# Patient Record
Sex: FEMALE | URBAN - METROPOLITAN AREA
[De-identification: names, ages, dates, MRNs, and addresses within clinical notes are randomized per-mention and may not be internally consistent; named-entity substitution may affect disease eponyms.]

---

## 2023-11-21 PROBLEM — E44.1 MILD PROTEIN-CALORIE MALNUTRITION (MULTI): Status: ACTIVE | Noted: 2023-11-21

## 2023-11-21 PROBLEM — M54.41 LUMBAGO WITH SCIATICA, RIGHT SIDE: Status: ACTIVE | Noted: 2023-11-21

## 2023-11-21 PROBLEM — G89.29 CHRONIC PAIN: Status: ACTIVE | Noted: 2023-11-21

## 2023-11-21 PROBLEM — I10 HYPERTENSION: Status: ACTIVE | Noted: 2023-11-21

## 2023-11-21 PROBLEM — E78.5 HYPERLIPIDEMIA: Status: ACTIVE | Noted: 2023-11-21

## 2023-11-22 ENCOUNTER — TELEPHONE (OUTPATIENT)
Dept: PRIMARY CARE | Facility: CLINIC | Age: 83
End: 2023-11-22

## 2023-11-22 NOTE — TELEPHONE ENCOUNTER
Katerina leaves message requesting Verbal Orders for Speech Therapy for patient at [facility], once per week for 10 weeks for cognition. Please Advise.

## 2024-01-02 ENCOUNTER — NURSING HOME VISIT (OUTPATIENT)
Dept: POST ACUTE CARE | Facility: EXTERNAL LOCATION | Age: 84
End: 2024-01-02

## 2024-01-02 DIAGNOSIS — E78.2 MIXED HYPERLIPIDEMIA: ICD-10-CM

## 2024-01-02 DIAGNOSIS — I10 PRIMARY HYPERTENSION: Primary | ICD-10-CM

## 2024-01-02 DIAGNOSIS — M15.9 PRIMARY OSTEOARTHRITIS INVOLVING MULTIPLE JOINTS: ICD-10-CM

## 2024-01-02 DIAGNOSIS — F02.B0 MODERATE DEMENTIA ASSOCIATED WITH OTHER UNDERLYING DISEASE, WITHOUT BEHAVIORAL DISTURBANCE, PSYCHOTIC DISTURBANCE, MOOD DISTURBANCE, OR ANXIETY (MULTI): ICD-10-CM

## 2024-01-02 ASSESSMENT — PAIN SCALES - GENERAL: PAINLEVEL: 0-NO PAIN

## 2024-01-03 VITALS
HEIGHT: 61 IN | SYSTOLIC BLOOD PRESSURE: 134 MMHG | BODY MASS INDEX: 19.6 KG/M2 | WEIGHT: 103.8 LBS | DIASTOLIC BLOOD PRESSURE: 68 MMHG | HEART RATE: 84 BPM | RESPIRATION RATE: 16 BRPM

## 2024-01-03 PROBLEM — M15.0 PRIMARY OSTEOARTHRITIS INVOLVING MULTIPLE JOINTS: Status: ACTIVE | Noted: 2024-01-03

## 2024-01-03 PROBLEM — F03.90 DEMENTIA WITHOUT BEHAVIORAL DISTURBANCE, PSYCHOTIC DISTURBANCE, MOOD DISTURBANCE, OR ANXIETY (MULTI): Status: ACTIVE | Noted: 2024-01-03

## 2024-01-03 PROBLEM — M15.9 PRIMARY OSTEOARTHRITIS INVOLVING MULTIPLE JOINTS: Status: ACTIVE | Noted: 2024-01-03

## 2024-01-03 RX ORDER — ACETAMINOPHEN 500 MG
1000 TABLET ORAL 2 TIMES DAILY
COMMUNITY
Start: 2023-08-14

## 2024-01-03 RX ORDER — PAROXETINE HYDROCHLORIDE 20 MG/1
20 TABLET, FILM COATED ORAL EVERY MORNING
COMMUNITY

## 2024-01-03 RX ORDER — ALUMINUM HYDROXIDE, MAGNESIUM HYDROXIDE, AND SIMETHICONE 1200; 120; 1200 MG/30ML; MG/30ML; MG/30ML
15 SUSPENSION ORAL EVERY 8 HOURS PRN
COMMUNITY

## 2024-01-03 RX ORDER — EMOLLIENT - LOTION
1 LOTION TOPICAL AS NEEDED
COMMUNITY

## 2024-01-03 RX ORDER — SIMVASTATIN 40 MG/1
40 TABLET, FILM COATED ORAL NIGHTLY
COMMUNITY

## 2024-01-03 RX ORDER — OLMESARTAN MEDOXOMIL 20 MG/1
20 TABLET ORAL DAILY
COMMUNITY

## 2024-01-03 RX ORDER — ACETAMINOPHEN 500 MG
500 TABLET ORAL EVERY 4 HOURS PRN
COMMUNITY

## 2024-01-03 RX ORDER — ALBUTEROL SULFATE 90 UG/1
2 AEROSOL, METERED RESPIRATORY (INHALATION) EVERY 6 HOURS PRN
COMMUNITY
Start: 2023-04-24

## 2024-01-03 RX ORDER — CERAMIDE 1,3,6-II/SALICYLIC/B3
1 CLEANSER (ML) TOPICAL AS NEEDED
COMMUNITY

## 2024-01-03 RX ORDER — ESOMEPRAZOLE MAGNESIUM 40 MG/1
1 CAPSULE, DELAYED RELEASE ORAL 2 TIMES DAILY
COMMUNITY

## 2024-01-03 ASSESSMENT — ENCOUNTER SYMPTOMS
FATIGUE: 0
APPETITE CHANGE: 0
FREQUENCY: 0
SHORTNESS OF BREATH: 0
SLEEP DISTURBANCE: 0
MYALGIAS: 1
WEAKNESS: 0
CONSTIPATION: 0
NERVOUS/ANXIOUS: 0
FEVER: 0
DIZZINESS: 0
ACTIVITY CHANGE: 0
HEADACHES: 0
COUGH: 0
RHINORRHEA: 0
ARTHRALGIAS: 1

## 2024-01-03 NOTE — PROGRESS NOTES
"Subjective   Patient ID: Berenice Townsend is a 83 y.o. female who is assisted living/ home patient being seen and evaluated at [facility] for Annual AL Re-certification.     HPI   Pt visited in apartment at [facility], oriented x1, comfortable, and denies pain. Pt denies changes in vision and hearing. Reports appetite is \"not bad\", denies difficulty with chewing and swallowing. Denies headache, dizziness, congestion, runny nose, and nausea and vomiting.          Pt denies chest pain, palpitations and sob at rest or exertion. Pt denies constipation and voiding symptoms. Pt ambulates independently.  Denies any recent falls. Pt with recent complaints of right knee pain due to arthritis. Tylenol was increased to routine twice daily, and has been effective as of currently. Pt denies sleep disturbances. Pt denies feelings of anxiety or sadness. Pt thankful for visit, and had no further questions. Collateral information obtained from nursing d/t pt's cognition.    Current Outpatient Medications on File Prior to Visit   Medication Sig Dispense Refill    acetaminophen (Tylenol) 500 mg tablet Take 2 tablets (1,000 mg) by mouth 2 times a day.      albuterol 90 mcg/actuation inhaler Inhale 2 puffs every 6 hours if needed for wheezing.      PARoxetine (Paxil) 20 mg tablet Take 1 tablet (20 mg) by mouth once daily in the morning.      simvastatin (Zocor) 40 mg tablet Take 1 tablet (40 mg) by mouth once daily at bedtime.      acetaminophen (Tylenol) 500 mg tablet Take 1 tablet (500 mg) by mouth every 4 hours if needed for moderate pain (4 - 6), headaches or mild pain (1 - 3).      alum-mag hydroxide-simeth (Mylanta) 200-200-20 mg/5 mL oral suspension Take 15 mL by mouth every 8 hours if needed for indigestion.      ceramides  lotion (CeraVe Daily Moisturizing) lotion lotion Apply 1 Application topically if needed (dry skin).      Cetaphil (Cetaphil Moisturizing) moisturizing lotion Apply 1 Application " "topically if needed for dry skin.      esomeprazole (NexIUM) 40 mg DR capsule Take 1 capsule (40 mg) by mouth 2 times a day.      menthol 5 % adhesive patch,medicated Place 1 patch on the skin 2 times a day as needed (pain).      olmesartan (BENIcar) 20 mg tablet Take 1 tablet (20 mg) by mouth once daily.       No current facility-administered medications on file prior to visit.        Review of Systems   Constitutional:  Negative for activity change, appetite change, fatigue and fever.   HENT:  Negative for congestion and rhinorrhea.    Respiratory:  Negative for cough and shortness of breath.    Cardiovascular:  Negative for leg swelling.   Gastrointestinal:  Negative for constipation.   Genitourinary:  Negative for frequency.   Musculoskeletal:  Positive for arthralgias and myalgias.   Neurological:  Negative for dizziness, weakness and headaches.   Psychiatric/Behavioral:  Negative for sleep disturbance. The patient is not nervous/anxious.        Objective   /68 (BP Location: Right arm, Patient Position: Sitting, BP Cuff Size: Adult)   Pulse 84   Resp 16 Comment: 96% on RA  Ht 1.549 m (5' 1\")   Wt 47.1 kg (103 lb 12.8 oz)   BMI 19.61 kg/m²     Physical Exam  Constitutional:       General: She is not in acute distress.     Appearance: Normal appearance. She is not ill-appearing.   HENT:      Head: Normocephalic.      Nose: Nose normal. No congestion or rhinorrhea.   Eyes:      Conjunctiva/sclera: Conjunctivae normal.      Pupils: Pupils are equal, round, and reactive to light.   Cardiovascular:      Rate and Rhythm: Normal rate and regular rhythm.      Heart sounds: Normal heart sounds.   Pulmonary:      Effort: Pulmonary effort is normal. No respiratory distress.      Breath sounds: Normal breath sounds. No wheezing or rhonchi.   Abdominal:      General: Bowel sounds are normal.   Musculoskeletal:         General: Normal range of motion.      Cervical back: Normal range of motion.   Skin:     General: " Skin is warm.      Capillary Refill: Capillary refill takes less than 2 seconds.   Neurological:      Mental Status: She is alert. Mental status is at baseline.      Comments: Oriented x1   Psychiatric:         Mood and Affect: Mood normal.         Behavior: Behavior normal.         Assessment/Plan   Diagnoses and all orders for this visit:  Primary hypertension  Comments:  Blood pressure controlled.  Mixed hyperlipidemia  Comments:  Continue simvastatin.  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  Primary osteoarthritis involving multiple joints  Comments:  Tylenol recently increased from once daily to twice daily scheduled.

## 2024-02-12 ENCOUNTER — NURSING HOME VISIT (OUTPATIENT)
Dept: POST ACUTE CARE | Facility: EXTERNAL LOCATION | Age: 84
End: 2024-02-12

## 2024-02-12 VITALS
TEMPERATURE: 98.2 F | SYSTOLIC BLOOD PRESSURE: 123 MMHG | HEART RATE: 80 BPM | RESPIRATION RATE: 16 BRPM | HEIGHT: 61 IN | OXYGEN SATURATION: 96 % | WEIGHT: 102.6 LBS | BODY MASS INDEX: 19.37 KG/M2 | DIASTOLIC BLOOD PRESSURE: 73 MMHG

## 2024-02-12 DIAGNOSIS — F51.01 PRIMARY INSOMNIA: Primary | ICD-10-CM

## 2024-02-12 RX ORDER — ACETAMINOPHEN 500 MG
5 TABLET ORAL NIGHTLY
Qty: 30 TABLET | Refills: 1 | Status: SHIPPED | OUTPATIENT
Start: 2024-02-12

## 2024-02-12 ASSESSMENT — ENCOUNTER SYMPTOMS
COUGH: 0
APPETITE CHANGE: 0
MYALGIAS: 1
NERVOUS/ANXIOUS: 0
SHORTNESS OF BREATH: 0
ACTIVITY CHANGE: 0
ARTHRALGIAS: 1
RHINORRHEA: 0
FATIGUE: 1
DIZZINESS: 0
SLEEP DISTURBANCE: 0
CONSTIPATION: 0
FREQUENCY: 0
FEVER: 0
WEAKNESS: 0
HEADACHES: 0
INSOMNIA: 1

## 2024-02-12 ASSESSMENT — PAIN SCALES - GENERAL: PAINLEVEL: 0-NO PAIN

## 2024-02-12 NOTE — LETTER
"Patient: Berenice Townsend  : 1940    Encounter Date: 2024    Subjective  Patient ID: Berenice Townsend is a 83 y.o. female who is assisted living/ home patient being seen at [facility] and evaluated for insomnia.     Insomnia  Associated symptoms include arthralgias, fatigue and myalgias. Pertinent negatives include no congestion, coughing, fever, headaches or weakness.      HPI  Pt visited in apartment with complaints of insomnia. Pt sleeping in chair, legs elevated on rollator. Pt easily awakens when name is called. Pt states \"I'm just taking a nap, I was going to get up now\". Pt admits to staying up late at night due to not being able to sleep. Pt states \"I need to get to bed early, but I'm busy doing stuff\". Pt admits to not feeling rested in the morning from being up late, and not able to fall asleep at a reasonable time. Pt denies fever, chills, headache, and dizziness. Appetite is fair. Breakfast tray in room, pt ate 50%.     Review of Systems   Constitutional:  Positive for fatigue. Negative for activity change, appetite change and fever.   HENT:  Negative for congestion and rhinorrhea.    Respiratory:  Negative for cough and shortness of breath.    Cardiovascular:  Negative for leg swelling.   Gastrointestinal:  Negative for constipation.   Genitourinary:  Negative for frequency.   Musculoskeletal:  Positive for arthralgias and myalgias.   Neurological:  Negative for dizziness, weakness and headaches.   Psychiatric/Behavioral:  Negative for sleep disturbance. The patient has insomnia. The patient is not nervous/anxious.        Objective  /73   Pulse 80   Temp 36.8 °C (98.2 °F)   Resp 16   Ht 1.549 m (5' 1\")   Wt 46.5 kg (102 lb 9.6 oz)   SpO2 96%   BMI 19.39 kg/m²     Physical Exam  Constitutional:       General: She is not in acute distress.     Appearance: Normal appearance. She is not ill-appearing.   HENT:      Head: Normocephalic.      Nose: Nose normal. No congestion or " rhinorrhea.   Eyes:      Conjunctiva/sclera: Conjunctivae normal.      Pupils: Pupils are equal, round, and reactive to light.   Cardiovascular:      Rate and Rhythm: Normal rate and regular rhythm.      Heart sounds: Normal heart sounds.   Pulmonary:      Effort: Pulmonary effort is normal. No respiratory distress.      Breath sounds: Normal breath sounds. No wheezing or rhonchi.   Abdominal:      General: Bowel sounds are normal.   Musculoskeletal:         General: Normal range of motion.      Cervical back: Normal range of motion.   Skin:     General: Skin is warm.      Capillary Refill: Capillary refill takes less than 2 seconds.   Neurological:      Mental Status: She is alert. Mental status is at baseline.      Comments: Oriented x1   Psychiatric:         Mood and Affect: Mood normal.         Behavior: Behavior normal.         Cognition and Memory: Cognition is impaired. Memory is impaired.         Assessment/Plan  Diagnoses and all orders for this visit:  Primary insomnia  -     melatonin 5 mg tablet; Take 1 tablet (5 mg) by mouth once daily at bedtime.            Electronically Signed By: KARELY Powell   2/12/24  9:50 PM

## 2024-02-13 NOTE — PROGRESS NOTES
"Subjective   Patient ID: Berenice Townsend is a 83 y.o. female who is assisted living/ home patient being seen at [facility] and evaluated for insomnia.     Insomnia  Associated symptoms include arthralgias, fatigue and myalgias. Pertinent negatives include no congestion, coughing, fever, headaches or weakness.      HPI  Pt visited in apartment with complaints of insomnia. Pt sleeping in chair, legs elevated on rollator. Pt easily awakens when name is called. Pt states \"I'm just taking a nap, I was going to get up now\". Pt admits to staying up late at night due to not being able to sleep. Pt states \"I need to get to bed early, but I'm busy doing stuff\". Pt admits to not feeling rested in the morning from being up late, and not able to fall asleep at a reasonable time. Pt denies fever, chills, headache, and dizziness. Appetite is fair. Breakfast tray in room, pt ate 50%.     Review of Systems   Constitutional:  Positive for fatigue. Negative for activity change, appetite change and fever.   HENT:  Negative for congestion and rhinorrhea.    Respiratory:  Negative for cough and shortness of breath.    Cardiovascular:  Negative for leg swelling.   Gastrointestinal:  Negative for constipation.   Genitourinary:  Negative for frequency.   Musculoskeletal:  Positive for arthralgias and myalgias.   Neurological:  Negative for dizziness, weakness and headaches.   Psychiatric/Behavioral:  Negative for sleep disturbance. The patient has insomnia. The patient is not nervous/anxious.        Objective   /73   Pulse 80   Temp 36.8 °C (98.2 °F)   Resp 16   Ht 1.549 m (5' 1\")   Wt 46.5 kg (102 lb 9.6 oz)   SpO2 96%   BMI 19.39 kg/m²     Physical Exam  Constitutional:       General: She is not in acute distress.     Appearance: Normal appearance. She is not ill-appearing.   HENT:      Head: Normocephalic.      Nose: Nose normal. No congestion or rhinorrhea.   Eyes:      Conjunctiva/sclera: Conjunctivae normal.      " Pupils: Pupils are equal, round, and reactive to light.   Cardiovascular:      Rate and Rhythm: Normal rate and regular rhythm.      Heart sounds: Normal heart sounds.   Pulmonary:      Effort: Pulmonary effort is normal. No respiratory distress.      Breath sounds: Normal breath sounds. No wheezing or rhonchi.   Abdominal:      General: Bowel sounds are normal.   Musculoskeletal:         General: Normal range of motion.      Cervical back: Normal range of motion.   Skin:     General: Skin is warm.      Capillary Refill: Capillary refill takes less than 2 seconds.   Neurological:      Mental Status: She is alert. Mental status is at baseline.      Comments: Oriented x1   Psychiatric:         Mood and Affect: Mood normal.         Behavior: Behavior normal.         Cognition and Memory: Cognition is impaired. Memory is impaired.         Assessment/Plan   Diagnoses and all orders for this visit:  Primary insomnia  -     melatonin 5 mg tablet; Take 1 tablet (5 mg) by mouth once daily at bedtime.

## 2024-07-08 ENCOUNTER — NURSING HOME VISIT (OUTPATIENT)
Dept: POST ACUTE CARE | Facility: EXTERNAL LOCATION | Age: 84
End: 2024-07-08
Payer: MEDICARE

## 2024-07-08 VITALS
WEIGHT: 97.4 LBS | HEART RATE: 87 BPM | SYSTOLIC BLOOD PRESSURE: 142 MMHG | OXYGEN SATURATION: 97 % | BODY MASS INDEX: 18.39 KG/M2 | TEMPERATURE: 97.9 F | HEIGHT: 61 IN | RESPIRATION RATE: 16 BRPM | DIASTOLIC BLOOD PRESSURE: 80 MMHG

## 2024-07-08 DIAGNOSIS — K59.00 CONSTIPATION, UNSPECIFIED CONSTIPATION TYPE: ICD-10-CM

## 2024-07-08 DIAGNOSIS — R41.0 CONFUSION: ICD-10-CM

## 2024-07-08 DIAGNOSIS — Z74.1 REQUIRES ASSISTANCE WITH ACTIVITIES OF DAILY LIVING (ADL): ICD-10-CM

## 2024-07-08 DIAGNOSIS — M15.9 PRIMARY OSTEOARTHRITIS INVOLVING MULTIPLE JOINTS: ICD-10-CM

## 2024-07-08 DIAGNOSIS — F32.A DEPRESSION, UNSPECIFIED DEPRESSION TYPE: ICD-10-CM

## 2024-07-08 DIAGNOSIS — R29.898 WEAKNESS OF BOTH LEGS: ICD-10-CM

## 2024-07-08 DIAGNOSIS — F02.B0 MODERATE DEMENTIA ASSOCIATED WITH OTHER UNDERLYING DISEASE, WITHOUT BEHAVIORAL DISTURBANCE, PSYCHOTIC DISTURBANCE, MOOD DISTURBANCE, OR ANXIETY (MULTI): Primary | ICD-10-CM

## 2024-07-08 DIAGNOSIS — I10 PRIMARY HYPERTENSION: ICD-10-CM

## 2024-07-08 DIAGNOSIS — E78.2 MIXED HYPERLIPIDEMIA: ICD-10-CM

## 2024-07-08 DIAGNOSIS — F51.01 PRIMARY INSOMNIA: ICD-10-CM

## 2024-07-08 DIAGNOSIS — R53.83 FATIGUE, UNSPECIFIED TYPE: ICD-10-CM

## 2024-07-08 PROCEDURE — 99349 HOME/RES VST EST MOD MDM 40: CPT

## 2024-07-08 RX ORDER — POLYETHYLENE GLYCOL 3350 17 G/17G
17 POWDER, FOR SOLUTION ORAL DAILY
COMMUNITY
Start: 2024-07-07

## 2024-07-08 RX ORDER — TRAZODONE HYDROCHLORIDE 50 MG/1
25 TABLET ORAL NIGHTLY
COMMUNITY
Start: 2024-06-01

## 2024-07-08 RX ORDER — IBUPROFEN 400 MG/1
400 TABLET ORAL EVERY 6 HOURS PRN
COMMUNITY
Start: 2024-07-06 | End: 2024-07-10

## 2024-07-08 RX ORDER — MEMANTINE HYDROCHLORIDE 5 MG/1
5 TABLET ORAL DAILY
COMMUNITY
Start: 2024-06-01

## 2024-07-08 RX ORDER — SERTRALINE HYDROCHLORIDE 50 MG/1
25 TABLET, FILM COATED ORAL DAILY
COMMUNITY
Start: 2024-07-10 | End: 2024-07-17

## 2024-07-08 ASSESSMENT — ENCOUNTER SYMPTOMS
CHILLS: 0
ARTHRALGIAS: 1
PALPITATIONS: 0
SHORTNESS OF BREATH: 0
ACTIVITY CHANGE: 0
SLEEP DISTURBANCE: 0
NERVOUS/ANXIOUS: 0
DIARRHEA: 0
CONFUSION: 1
FEVER: 0
FATIGUE: 1
RHINORRHEA: 0
MYALGIAS: 1
DIZZINESS: 0
FREQUENCY: 0
VOMITING: 0
CONSTIPATION: 0
WEAKNESS: 0
COUGH: 0
HEADACHES: 0
APPETITE CHANGE: 1
NAUSEA: 0

## 2024-07-08 ASSESSMENT — PAIN SCALES - GENERAL: PAINLEVEL: 0-NO PAIN

## 2024-07-08 NOTE — LETTER
"Patient: Berenice Townsend  : 1940    Encounter Date: 2024    Subjective  Patient ID: Berenice Townsend is a 84 y.o. female who is assisted living/ home patient being seen at [facility], and evaluated for Routine AL Visit.     HPI   Pt visited in apartment at assisted living, oriented x1, comfortable, and denies pain. Pt denies changes in vision and hearing. Reports appetite is \"poor, I'm just not hungry.\" Pt's breakfast untouched from this morning, pt with a visible weight loss. Pt eats meals in apartment. Pt denies difficulty with chewing and swallowing. Denies headache, dizziness, congestion, runny nose, and nausea and vomiting. Pt states \"I have been feeling tired, and my legs feel week\". Pt admits to increased confusion over the last few days.    Pt denies chest pain, sob at rest or exertion. Pt denies constipation and voiding symptoms. Pt was started on miralax over the weekend for constipation. Pt has since had a bowel movement. Pt ambulates with rollator. Denies any recent falls. Pt with OA pain to bilateral knees, started on ibuprofen over the weekend prn. Pt denies sleep disturbances, and currently sleeping on couch at bedtime. Bed full of clothes, pt denies this is reason for sleeping on couch, and states \"I always use to sleep on the couch\".  Pt denies feelings of anxiety or sadness. Pt follows psych for depression. Pt thankful for visit, and had no further questions. Collateral information obtained from nursing due to cognition secondary to dementia.     Current Outpatient Medications on File Prior to Visit   Medication Sig Dispense Refill   • acetaminophen (Tylenol) 500 mg tablet Take 1 tablet (500 mg) by mouth every 4 hours if needed for moderate pain (4 - 6), headaches or mild pain (1 - 3).     • acetaminophen (Tylenol) 500 mg tablet Take 2 tablets (1,000 mg) by mouth 2 times a day.     • albuterol 90 mcg/actuation inhaler Inhale 2 puffs every 6 hours if needed for wheezing.     • " alum-mag hydroxide-simeth (Mylanta) 200-200-20 mg/5 mL oral suspension Take 15 mL by mouth every 8 hours if needed for indigestion.     • ceramides  lotion (CeraVe Daily Moisturizing) lotion lotion Apply 1 Application topically if needed (dry skin).     • Cetaphil (Cetaphil Moisturizing) moisturizing lotion Apply 1 Application topically if needed for dry skin.     • esomeprazole (NexIUM) 40 mg DR capsule Take 1 capsule (40 mg) by mouth 2 times a day.     • ibuprofen 400 mg tablet Take 1 tablet (400 mg) by mouth every 6 hours if needed for fever (temp greater than 38.0 C), moderate pain (4 - 6), mild pain (1 - 3) or headaches.     • melatonin 5 mg tablet Take 1 tablet (5 mg) by mouth once daily at bedtime. 30 tablet 1   • memantine (Namenda) 5 mg tablet Take 1 tablet (5 mg) by mouth once daily.     • menthol 5 % adhesive patch,medicated Place 1 patch on the skin 2 times a day as needed (pain).     • olmesartan (BENIcar) 20 mg tablet Take 1 tablet (20 mg) by mouth once daily.     • polyethylene glycol (Glycolax, Miralax) 17 gram packet Take 17 g by mouth once daily.     • sertraline (Zoloft) 50 mg tablet Take 1 tablet (50 mg) by mouth once daily.     • simvastatin (Zocor) 40 mg tablet Take 1 tablet (40 mg) by mouth once daily at bedtime.     • traZODone (Desyrel) 50 mg tablet Take 0.5 tablets (25 mg) by mouth once daily at bedtime.     • [DISCONTINUED] PARoxetine (Paxil) 20 mg tablet Take 1 tablet (20 mg) by mouth once daily in the morning.       No current facility-administered medications on file prior to visit.        Review of Systems   Constitutional:  Positive for appetite change and fatigue. Negative for activity change, chills and fever.   HENT:  Negative for congestion and rhinorrhea.    Respiratory:  Negative for cough and shortness of breath.    Cardiovascular:  Negative for chest pain, palpitations and leg swelling.   Gastrointestinal:  Negative for constipation, diarrhea, nausea and vomiting.  "  Genitourinary:  Negative for frequency.   Musculoskeletal:  Positive for arthralgias, gait problem and myalgias.        Ambulates with rollator   Neurological:  Negative for dizziness, weakness and headaches.   Psychiatric/Behavioral:  Positive for confusion. Negative for sleep disturbance. The patient is not nervous/anxious.        Objective  /80 (BP Location: Right arm, Patient Position: Sitting, BP Cuff Size: Adult)   Pulse 87   Temp 36.6 °C (97.9 °F)   Resp 16   Ht 1.549 m (5' 1\")   Wt (!) 44.2 kg (97 lb 6.4 oz)   SpO2 97%   BMI 18.40 kg/m²     Physical Exam  Constitutional:       General: She is awake. She is not in acute distress.     Appearance: Normal appearance. She is underweight. She is not ill-appearing.   HENT:      Head: Normocephalic.      Nose: Nose normal. No congestion or rhinorrhea.      Mouth/Throat:      Mouth: Mucous membranes are moist.   Eyes:      Conjunctiva/sclera: Conjunctivae normal.      Pupils: Pupils are equal, round, and reactive to light.   Cardiovascular:      Rate and Rhythm: Normal rate and regular rhythm.      Pulses: Normal pulses.      Heart sounds: Normal heart sounds.   Pulmonary:      Effort: Pulmonary effort is normal. No respiratory distress.      Breath sounds: Normal breath sounds. No wheezing or rhonchi.   Abdominal:      General: Bowel sounds are normal.      Palpations: Abdomen is soft.   Musculoskeletal:         General: Normal range of motion.      Cervical back: Normal range of motion.      Right lower leg: No edema.      Left lower leg: No edema.   Skin:     General: Skin is warm.      Capillary Refill: Capillary refill takes less than 2 seconds.   Neurological:      Mental Status: She is alert. Mental status is at baseline.      Comments: Oriented x1   Psychiatric:         Mood and Affect: Mood normal.         Behavior: Behavior normal. Behavior is cooperative.         Cognition and Memory: Cognition is impaired. Memory is impaired. 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  Primary insomnia  Comments:  Continue melatonin.  Primary hypertension  Comments:  Blood pressure controlled.  Mixed hyperlipidemia  Comments:  Continue simvastatin.  Primary osteoarthritis involving multiple joints  Comments:  Ibuprofen started over weekend for pain.  Constipation, unspecified constipation type  Comments:  Miralax started over weekend for constipation.  Confusion  Comments:  Obtain UA  Requires assistance with activities of daily living (ADL)  Comments:  Facility chart reviewed, medication reconciled, BMP, CBC, Lipid Panel, and Vitamin D ordered.  Depression, unspecified depression type  Comments:  Continue to follow psych for medication mgmt.  Fatigue, unspecified type  Comments:  BMP, CBC, Vitamin D level ordered.            Electronically Signed By: KARELY Powell   7/8/24  3:47 PM

## 2024-07-08 NOTE — PROGRESS NOTES
"Subjective   Patient ID: Berenice Townsend is a 84 y.o. female who is assisted living/ home patient being seen at [facility], and evaluated for Routine AL Visit.     HPI   Pt visited in apartment at [facility], oriented x1, comfortable, and denies pain. Pt denies changes in vision and hearing. Reports appetite is \"poor, I'm just not hungry.\" Pt's breakfast untouched from this morning, pt with a visible weight loss. Pt eats meals in apartment. Pt denies difficulty with chewing and swallowing. Denies headache, dizziness, congestion, runny nose, and nausea and vomiting. Pt states \"I have been feeling tired, and my legs feel week\". Pt admits to increased confusion over the last few days.    Pt denies chest pain, sob at rest or exertion. Pt denies constipation and voiding symptoms. Pt was started on miralax over the weekend for constipation. Pt has since had a bowel movement. Pt ambulates with rollator. Denies any recent falls. Pt with OA pain to bilateral knees, started on ibuprofen over the weekend prn. Pt denies sleep disturbances, and currently sleeping on couch at bedtime. Bed full of clothes, pt denies this is reason for sleeping on couch, and states \"I always use to sleep on the couch\".  Pt denies feelings of anxiety or sadness. Pt follows psych for depression. Pt thankful for visit, and had no further questions. Collateral information obtained from nursing due to cognition secondary to dementia. Pt would benefit with PT/OT to increase strength, endurance, mobility and decrease risk for falls.     Current Outpatient Medications on File Prior to Visit   Medication Sig Dispense Refill    acetaminophen (Tylenol) 500 mg tablet Take 1 tablet (500 mg) by mouth every 4 hours if needed for moderate pain (4 - 6), headaches or mild pain (1 - 3).      acetaminophen (Tylenol) 500 mg tablet Take 2 tablets (1,000 mg) by mouth 2 times a day.      albuterol 90 mcg/actuation inhaler Inhale 2 puffs every 6 hours if needed for " wheezing.      alum-mag hydroxide-simeth (Mylanta) 200-200-20 mg/5 mL oral suspension Take 15 mL by mouth every 8 hours if needed for indigestion.      ceramides  lotion (CeraVe Daily Moisturizing) lotion lotion Apply 1 Application topically if needed (dry skin).      Cetaphil (Cetaphil Moisturizing) moisturizing lotion Apply 1 Application topically if needed for dry skin.      esomeprazole (NexIUM) 40 mg DR capsule Take 1 capsule (40 mg) by mouth 2 times a day.      ibuprofen 400 mg tablet Take 1 tablet (400 mg) by mouth every 6 hours if needed for fever (temp greater than 38.0 C), moderate pain (4 - 6), mild pain (1 - 3) or headaches.      melatonin 5 mg tablet Take 1 tablet (5 mg) by mouth once daily at bedtime. 30 tablet 1    memantine (Namenda) 5 mg tablet Take 1 tablet (5 mg) by mouth once daily.      menthol 5 % adhesive patch,medicated Place 1 patch on the skin 2 times a day as needed (pain).      olmesartan (BENIcar) 20 mg tablet Take 1 tablet (20 mg) by mouth once daily.      polyethylene glycol (Glycolax, Miralax) 17 gram packet Take 17 g by mouth once daily.      sertraline (Zoloft) 50 mg tablet Take 1 tablet (50 mg) by mouth once daily.      simvastatin (Zocor) 40 mg tablet Take 1 tablet (40 mg) by mouth once daily at bedtime.      traZODone (Desyrel) 50 mg tablet Take 0.5 tablets (25 mg) by mouth once daily at bedtime.      [DISCONTINUED] PARoxetine (Paxil) 20 mg tablet Take 1 tablet (20 mg) by mouth once daily in the morning.       No current facility-administered medications on file prior to visit.        Review of Systems   Constitutional:  Positive for appetite change and fatigue. Negative for activity change, chills and fever.   HENT:  Negative for congestion and rhinorrhea.    Respiratory:  Negative for cough and shortness of breath.    Cardiovascular:  Negative for chest pain, palpitations and leg swelling.   Gastrointestinal:  Negative for constipation, diarrhea, nausea and vomiting.  "  Genitourinary:  Negative for frequency.   Musculoskeletal:  Positive for arthralgias, gait problem and myalgias.        Ambulates with rollator   Neurological:  Negative for dizziness, weakness and headaches.   Psychiatric/Behavioral:  Positive for confusion. Negative for sleep disturbance. The patient is not nervous/anxious.        Objective   /80 (BP Location: Right arm, Patient Position: Sitting, BP Cuff Size: Adult)   Pulse 87   Temp 36.6 °C (97.9 °F)   Resp 16   Ht 1.549 m (5' 1\")   Wt (!) 44.2 kg (97 lb 6.4 oz)   SpO2 97%   BMI 18.40 kg/m²     Physical Exam  Constitutional:       General: She is awake. She is not in acute distress.     Appearance: Normal appearance. She is underweight. She is not ill-appearing.   HENT:      Head: Normocephalic.      Nose: Nose normal. No congestion or rhinorrhea.      Mouth/Throat:      Mouth: Mucous membranes are moist.   Eyes:      Conjunctiva/sclera: Conjunctivae normal.      Pupils: Pupils are equal, round, and reactive to light.   Cardiovascular:      Rate and Rhythm: Normal rate and regular rhythm.      Pulses: Normal pulses.      Heart sounds: Normal heart sounds.   Pulmonary:      Effort: Pulmonary effort is normal. No respiratory distress.      Breath sounds: Normal breath sounds. No wheezing or rhonchi.   Abdominal:      General: Bowel sounds are normal.      Palpations: Abdomen is soft.   Musculoskeletal:         General: Normal range of motion.      Cervical back: Normal range of motion.      Right lower leg: No edema.      Left lower leg: No edema.   Skin:     General: Skin is warm.      Capillary Refill: Capillary refill takes less than 2 seconds.   Neurological:      Mental Status: She is alert. Mental status is at baseline.      Comments: Oriented x1   Psychiatric:         Mood and Affect: Mood normal.         Behavior: Behavior normal. Behavior is cooperative.         Cognition and Memory: Cognition is impaired. Memory is impaired. 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  Primary insomnia  Comments:  Continue melatonin.  Primary hypertension  Comments:  Blood pressure controlled.  Mixed hyperlipidemia  Comments:  Continue simvastatin.  Primary osteoarthritis involving multiple joints  Comments:  Ibuprofen started over weekend for pain.  Constipation, unspecified constipation type  Comments:  Miralax started over weekend for constipation.  Confusion  Comments:  Obtain UA  Requires assistance with activities of daily living (ADL)  Comments:  Facility chart reviewed, medication reconciled, BMP, CBC, Lipid Panel, and Vitamin D ordered.  Depression, unspecified depression type  Comments:  Continue to follow psych for medication mgmt.  Fatigue, unspecified type  Comments:  BMP, CBC, Vitamin D level ordered.

## 2024-07-10 ENCOUNTER — HOSPITAL ENCOUNTER (EMERGENCY)
Facility: HOSPITAL | Age: 84
Discharge: HOME | End: 2024-07-11
Attending: STUDENT IN AN ORGANIZED HEALTH CARE EDUCATION/TRAINING PROGRAM | Admitting: EMERGENCY MEDICINE
Payer: MEDICARE

## 2024-07-10 VITALS
BODY MASS INDEX: 17.27 KG/M2 | DIASTOLIC BLOOD PRESSURE: 93 MMHG | OXYGEN SATURATION: 96 % | RESPIRATION RATE: 15 BRPM | WEIGHT: 97.44 LBS | SYSTOLIC BLOOD PRESSURE: 172 MMHG | HEIGHT: 63 IN | HEART RATE: 80 BPM | TEMPERATURE: 97.7 F

## 2024-07-10 DIAGNOSIS — E87.1 HYPONATREMIA: Primary | ICD-10-CM

## 2024-07-10 LAB
ANION GAP SERPL CALC-SCNC: 9 MMOL/L
APPEARANCE UR: CLEAR
BACTERIA #/AREA URNS AUTO: ABNORMAL /HPF
BASOPHILS # BLD AUTO: 0.01 X10*3/UL (ref 0–0.1)
BASOPHILS NFR BLD AUTO: 0.1 %
BILIRUB UR STRIP.AUTO-MCNC: NEGATIVE MG/DL
BUN SERPL-MCNC: 14 MG/DL (ref 8–25)
CALCIUM SERPL-MCNC: 8.7 MG/DL (ref 8.5–10.4)
CHLORIDE SERPL-SCNC: 86 MMOL/L (ref 97–107)
CO2 SERPL-SCNC: 25 MMOL/L (ref 24–31)
COLOR UR: ABNORMAL
CREAT SERPL-MCNC: 0.6 MG/DL (ref 0.4–1.6)
CREAT UR-MCNC: 60.4 MG/DL
EGFRCR SERPLBLD CKD-EPI 2021: 88 ML/MIN/1.73M*2
EOSINOPHIL # BLD AUTO: 0.03 X10*3/UL (ref 0–0.4)
EOSINOPHIL NFR BLD AUTO: 0.3 %
ERYTHROCYTE [DISTWIDTH] IN BLOOD BY AUTOMATED COUNT: 13.2 % (ref 11.5–14.5)
GLUCOSE SERPL-MCNC: 109 MG/DL (ref 65–99)
GLUCOSE UR STRIP.AUTO-MCNC: NORMAL MG/DL
HCT VFR BLD AUTO: 30.9 % (ref 36–46)
HGB BLD-MCNC: 10.6 G/DL (ref 12–16)
IMM GRANULOCYTES # BLD AUTO: 0.06 X10*3/UL (ref 0–0.5)
IMM GRANULOCYTES NFR BLD AUTO: 0.6 % (ref 0–0.9)
KETONES UR STRIP.AUTO-MCNC: NEGATIVE MG/DL
LEUKOCYTE ESTERASE UR QL STRIP.AUTO: NEGATIVE
LYMPHOCYTES # BLD AUTO: 1.14 X10*3/UL (ref 0.8–3)
LYMPHOCYTES NFR BLD AUTO: 12.2 %
MCH RBC QN AUTO: 31.9 PG (ref 26–34)
MCHC RBC AUTO-ENTMCNC: 34.3 G/DL (ref 32–36)
MCV RBC AUTO: 93 FL (ref 80–100)
MONOCYTES # BLD AUTO: 0.86 X10*3/UL (ref 0.05–0.8)
MONOCYTES NFR BLD AUTO: 9.2 %
MUCOUS THREADS #/AREA URNS AUTO: ABNORMAL /LPF
NEUTROPHILS # BLD AUTO: 7.28 X10*3/UL (ref 1.6–5.5)
NEUTROPHILS NFR BLD AUTO: 77.6 %
NITRITE UR QL STRIP.AUTO: NEGATIVE
NRBC BLD-RTO: 0 /100 WBCS (ref 0–0)
PH UR STRIP.AUTO: 6.5 [PH]
PLATELET # BLD AUTO: 286 X10*3/UL (ref 150–450)
POTASSIUM SERPL-SCNC: 4.4 MMOL/L (ref 3.4–5.1)
PROT UR STRIP.AUTO-MCNC: ABNORMAL MG/DL
RBC # BLD AUTO: 3.32 X10*6/UL (ref 4–5.2)
RBC # UR STRIP.AUTO: ABNORMAL /UL
RBC #/AREA URNS AUTO: ABNORMAL /HPF
SODIUM SERPL-SCNC: 120 MMOL/L (ref 133–145)
SODIUM UR-SCNC: 38 MMOL/L
SODIUM/CREAT UR-RTO: 63 MMOL/G CREAT
SP GR UR STRIP.AUTO: 1.01
SQUAMOUS #/AREA URNS AUTO: ABNORMAL /HPF
UROBILINOGEN UR STRIP.AUTO-MCNC: NORMAL MG/DL
WBC # BLD AUTO: 9.4 X10*3/UL (ref 4.4–11.3)
WBC #/AREA URNS AUTO: ABNORMAL /HPF

## 2024-07-10 PROCEDURE — 83930 ASSAY OF BLOOD OSMOLALITY: CPT | Mod: TRILAB,WESLAB | Performed by: STUDENT IN AN ORGANIZED HEALTH CARE EDUCATION/TRAINING PROGRAM

## 2024-07-10 PROCEDURE — 81001 URINALYSIS AUTO W/SCOPE: CPT | Performed by: STUDENT IN AN ORGANIZED HEALTH CARE EDUCATION/TRAINING PROGRAM

## 2024-07-10 PROCEDURE — 36415 COLL VENOUS BLD VENIPUNCTURE: CPT | Performed by: STUDENT IN AN ORGANIZED HEALTH CARE EDUCATION/TRAINING PROGRAM

## 2024-07-10 PROCEDURE — 83935 ASSAY OF URINE OSMOLALITY: CPT | Mod: TRILAB,WESLAB | Performed by: STUDENT IN AN ORGANIZED HEALTH CARE EDUCATION/TRAINING PROGRAM

## 2024-07-10 PROCEDURE — 82570 ASSAY OF URINE CREATININE: CPT | Performed by: STUDENT IN AN ORGANIZED HEALTH CARE EDUCATION/TRAINING PROGRAM

## 2024-07-10 PROCEDURE — 99284 EMERGENCY DEPT VISIT MOD MDM: CPT | Performed by: STUDENT IN AN ORGANIZED HEALTH CARE EDUCATION/TRAINING PROGRAM

## 2024-07-10 PROCEDURE — 85025 COMPLETE CBC W/AUTO DIFF WBC: CPT | Performed by: STUDENT IN AN ORGANIZED HEALTH CARE EDUCATION/TRAINING PROGRAM

## 2024-07-10 PROCEDURE — 1210000001 HC SEMI-PRIVATE ROOM DAILY

## 2024-07-10 PROCEDURE — 80048 BASIC METABOLIC PNL TOTAL CA: CPT | Performed by: STUDENT IN AN ORGANIZED HEALTH CARE EDUCATION/TRAINING PROGRAM

## 2024-07-10 ASSESSMENT — COLUMBIA-SUICIDE SEVERITY RATING SCALE - C-SSRS
6. HAVE YOU EVER DONE ANYTHING, STARTED TO DO ANYTHING, OR PREPARED TO DO ANYTHING TO END YOUR LIFE?: NO
2. HAVE YOU ACTUALLY HAD ANY THOUGHTS OF KILLING YOURSELF?: NO
1. IN THE PAST MONTH, HAVE YOU WISHED YOU WERE DEAD OR WISHED YOU COULD GO TO SLEEP AND NOT WAKE UP?: NO

## 2024-07-10 ASSESSMENT — PAIN SCALES - GENERAL
PAINLEVEL_OUTOF10: 0 - NO PAIN
PAINLEVEL_OUTOF10: 0 - NO PAIN

## 2024-07-10 ASSESSMENT — PAIN - FUNCTIONAL ASSESSMENT: PAIN_FUNCTIONAL_ASSESSMENT: 0-10

## 2024-07-10 NOTE — PROGRESS NOTES
Selin LPN calls to review BMP/CBC. Sodium of 125. Elevated CBC 12, UA was negative. Pt with increased confusion from baseline, pt has dementia at baseline. Ibuprofen discontinued. Decrease fluid intake. Selin spoke to Rozina GUILLEN, psych NP who decreased sertraline 50mg to 25mg x 7 days and then stop, and start buspar 5mg BID. Repeat BMP/CBC on 7/16.  Notify provider if condition worsens. At this time pt is stable, and a DNR Comfort Care at facility.

## 2024-07-10 NOTE — ED PROVIDER NOTES
HPI   Chief Complaint   Patient presents with    Altered Mental Status     Pt is from [facility], stated that she did not feel right. [facility] stated that the pt's sodium level was 125 and needed her to get checked out.        HPI  Patient is an 84-year-old female with history of dementia presenting from [facility] for evaluation of fatigue and weakness with the sodium level of 125.  Facility also concerned that the patient was acting slightly more confused than normal.  Patient otherwise without acute complaints and awake and alert and pleasant upon arrival to the ER.  Patient is DNR comfort care per power of .                  Arp Coma Scale Score: 15                     Patient History   Past Medical History:   Diagnosis Date    Chronic back pain     Dementia (Multi)     Depression     Hyperlipidemia     Hypertension     Sciatic nerve pain, right     RLE    Scoliosis      No past surgical history on file.  No family history on file.  Social History     Tobacco Use    Smoking status: Unknown    Smokeless tobacco: Not on file   Substance Use Topics    Alcohol use: Defer    Drug use: Never       Physical Exam   ED Triage Vitals [07/10/24 1812]   Temperature Heart Rate Respirations BP   36.5 °C (97.7 °F) 80 15 (!) 172/93      Pulse Ox Temp Source Heart Rate Source Patient Position   96 % Oral Monitor --      BP Location FiO2 (%)     Right arm --       Physical Exam  Vitals and nursing note reviewed.   Constitutional:       General: She is not in acute distress.     Appearance: She is well-developed.      Comments: Pleasantly demented female resting in hospital bed in no apparent distress   HENT:      Head: Normocephalic and atraumatic.      Mouth/Throat:      Comments: Mucous membranes dry  Eyes:      Conjunctiva/sclera: Conjunctivae normal.   Cardiovascular:      Rate and Rhythm: Normal rate and regular rhythm.      Heart sounds: No murmur heard.  Pulmonary:      Effort: Pulmonary effort is  normal. No respiratory distress.      Breath sounds: Normal breath sounds.   Abdominal:      Palpations: Abdomen is soft.      Tenderness: There is no abdominal tenderness.   Musculoskeletal:         General: No swelling.      Cervical back: Neck supple.   Skin:     General: Skin is warm and dry.      Capillary Refill: Capillary refill takes less than 2 seconds.   Neurological:      Mental Status: She is alert.   Psychiatric:         Mood and Affect: Mood normal.         ED Course & MDM   Diagnoses as of 07/11/24 0945   Hyponatremia       Medical Decision Making  Parts of this chart have been completed using voice recognition software. Please excuse any errors of transcription.  My thought process and reason for plan has been formulated from the time that I saw the patient until the time of disposition and is not specific to one specific moment during their visit and furthermore my MDM encompasses this entire chart and not only this text box.      HPI: Detailed above.    Exam: A medically appropriate exam performed, outlined above, given the known history and presentation.    History obtained from: Power of , facility    Social Determinants of Health considered during this visit: Patient is DNR comfort care    Medications given during visit:  Medications - No data to display     Diagnostic/tests  Labs Reviewed   CBC WITH AUTO DIFFERENTIAL - Abnormal       Result Value    WBC 9.4      nRBC 0.0      RBC 3.32 (*)     Hemoglobin 10.6 (*)     Hematocrit 30.9 (*)     MCV 93      MCH 31.9      MCHC 34.3      RDW 13.2      Platelets 286      Neutrophils % 77.6      Immature Granulocytes %, Automated 0.6      Lymphocytes % 12.2      Monocytes % 9.2      Eosinophils % 0.3      Basophils % 0.1      Neutrophils Absolute 7.28 (*)     Immature Granulocytes Absolute, Automated 0.06      Lymphocytes Absolute 1.14      Monocytes Absolute 0.86 (*)     Eosinophils Absolute 0.03      Basophils Absolute 0.01     BASIC METABOLIC  PANEL - Abnormal    Glucose 109 (*)     Sodium 120 (*)     Potassium 4.4      Chloride 86 (*)     Bicarbonate 25      Urea Nitrogen 14      Creatinine 0.60      eGFR 88      Calcium 8.7      Anion Gap 9     OSMOLALITY - Abnormal    Osmolality, Serum 255 (*)    URINALYSIS WITH REFLEX CULTURE AND MICROSCOPIC - Abnormal    Color, Urine Light-Yellow      Appearance, Urine Clear      Specific Gravity, Urine 1.015      pH, Urine 6.5      Protein, Urine 30 (1+) (*)     Glucose, Urine Normal      Blood, Urine 0.2 (2+) (*)     Ketones, Urine NEGATIVE      Bilirubin, Urine NEGATIVE      Urobilinogen, Urine Normal      Nitrite, Urine NEGATIVE      Leukocyte Esterase, Urine NEGATIVE     URINALYSIS MICROSCOPIC WITH REFLEX CULTURE - Abnormal    WBC, Urine 1-5      RBC, Urine 11-20 (*)     Squamous Epithelial Cells, Urine 1-9 (SPARSE)      Bacteria, Urine 1+ (*)     Mucus, Urine FEW     OSMOLALITY, URINE - Normal    Osmolality, Urine Random 398     SODIUM, URINE RANDOM    Sodium, Urine Random 38      Creatinine, Urine Random 60.4      Sodium/Creatinine Ratio 63     URINALYSIS WITH REFLEX CULTURE AND MICROSCOPIC    Narrative:     The following orders were created for panel order Urinalysis with Reflex Culture and Microscopic.  Procedure                               Abnormality         Status                     ---------                               -----------         ------                     Urinalysis with Reflex C...[[id number]]  Abnormal            Final result               Extra Urine Gray Tube[[id number]]                                                         Please view results for these tests on the individual orders.   EXTRA URINE GRAY TUBE      No orders to display        Considerations/further MDM:  Patient is a 84-year-old female presenting for evaluation of hyponatremia, fatigue    Patient hemodynamically stable and pleasantly demented at her baseline mental status per power of .  She has no evidence of  airway compromise or respiratory distress on exam.  She has no acute complaints at this time.  Laboratory workup pursued and remarkable for a sodium of 120.  Urinalysis without leukocyte esterase or nitrites, no evidence of acute urinary tract infection.  Urine sodium, urine osmolality and serum osmolality were pursued.  Discussed the case with the patient's power of  who did wish to pursue hospitalization for treatment of her hyponatremia.  Discussed patient case with the hospitalist who did accept the patient for treatment but upon speaking with the power of  she did not wish to revoke the patient's DNR comfort care status.  She would like to pursue hospice care at this time.  Thus patient will not be admitted for further treatment of her hyponatremia.  She was discharged back to her facility in stable condition with plan to pursue hospice care.  I saw this patient in conjunction with Dr. Farnsworth.  Please refer to his note for additional details regarding patient case.      Procedure  Procedures     Mariia Becker PA-C  07/11/24 0953

## 2024-07-11 ENCOUNTER — TELEPHONE (OUTPATIENT)
Dept: PRIMARY CARE | Facility: CLINIC | Age: 84
End: 2024-07-11
Payer: MEDICARE

## 2024-07-11 LAB
OSMOLALITY SERPL: 255 MOSM/KG (ref 280–300)
OSMOLALITY UR: 398 MOSM/KG (ref 200–1200)

## 2024-07-11 NOTE — TELEPHONE ENCOUNTER
Thank you for doing the leg work. Speaking to the facility this morning they really appreciate it.

## 2024-07-11 NOTE — CONSULTS
Subjective 84 year old female resides in a nursing home. Outpatient labs reportedly showed a Na of 125. Patient was sent here for evaluation. Repeat Na is 120 now in the ED. ED physician had spoken with Kacy Gregg, the step daughter (POSARAH).  Kacy wanted the patient treated.    Patient herself is very confused and restless and cannot provide any history.    Past Medical History:   Diagnosis Date    Chronic back pain     Dementia (Multi)     Depression     Hyperlipidemia     Hypertension     Sciatic nerve pain, right     RLE    Scoliosis        No past surgical history on file.    Allergies   Allergen Reactions    Codeine Unknown    Gabapentin Unknown    Hydrochlorothiazide Unknown    Tramadol Unknown        Review of systems:  Unable to obtain due to confusion      Prior to Admission medications    Medication Sig Start Date End Date Taking? Authorizing Provider   acetaminophen (Tylenol) 500 mg tablet Take 1 tablet (500 mg) by mouth every 4 hours if needed for moderate pain (4 - 6), headaches or mild pain (1 - 3).    Historical Provider, MD   acetaminophen (Tylenol) 500 mg tablet Take 2 tablets (1,000 mg) by mouth 2 times a day. 8/14/23   Historical Provider, MD   albuterol 90 mcg/actuation inhaler Inhale 2 puffs every 6 hours if needed for wheezing. 4/24/23   Historical Provider, MD   alum-mag hydroxide-simeth (Mylanta) 200-200-20 mg/5 mL oral suspension Take 15 mL by mouth every 8 hours if needed for indigestion.    Historical Provider, MD   ceramides  lotion (CeraVe Daily Moisturizing) lotion lotion Apply 1 Application topically if needed (dry skin).    Historical Provider, MD   Cetaphil (Cetaphil Moisturizing) moisturizing lotion Apply 1 Application topically if needed for dry skin.    Historical Provider, MD   esomeprazole (NexIUM) 40 mg DR capsule Take 1 capsule (40 mg) by mouth 2 times a day.    Historical Provider, MD   melatonin 5 mg tablet Take 1 tablet (5 mg) by mouth once daily at bedtime.  2/12/24   Berenice Figueredo, APRN-CNP   memantine (Namenda) 5 mg tablet Take 1 tablet (5 mg) by mouth once daily. 6/1/24   Historical Provider, MD   menthol 5 % adhesive patch,medicated Place 1 patch on the skin 2 times a day as needed (pain).    Historical Provider, MD   olmesartan (BENIcar) 20 mg tablet Take 1 tablet (20 mg) by mouth once daily.    Historical Provider, MD   polyethylene glycol (Glycolax, Miralax) 17 gram packet Take 17 g by mouth once daily. 7/7/24   Historical Provider, MD   sertraline (Zoloft) 50 mg tablet Take 0.5 tablets (25 mg) by mouth once daily. Decrease from 50mg to 25mg x 7 days and then discontinue. 7/10/24 7/17/24  Rozina Navarro APRN-CNP   simvastatin (Zocor) 40 mg tablet Take 1 tablet (40 mg) by mouth once daily at bedtime.    Historical Provider, MD   traZODone (Desyrel) 50 mg tablet Take 0.5 tablets (25 mg) by mouth once daily at bedtime. 6/1/24   Historical Provider, MD   ibuprofen 400 mg tablet Take 1 tablet (400 mg) by mouth every 6 hours if needed for fever (temp greater than 38.0 C), moderate pain (4 - 6), mild pain (1 - 3) or headaches. 7/6/24 7/10/24  Historical Provider, MD   PARoxetine (Paxil) 20 mg tablet Take 1 tablet (20 mg) by mouth once daily in the morning.  7/8/24  Historical Provider, MD        Vitals:    07/10/24 1812   BP: (!) 172/93   Pulse: 80   Resp: 15   Temp: 36.5 °C (97.7 °F)   SpO2: 96%     General: Alert but restless and very confused. Cooperative when redirected  HEENT: PERRL with EOMI. Speech clear  NECK: Baseline ROM  CV: HRRR with Grade III systolic murmur loudest at the apex  PULM: LCTA all lobes. No supplemental oxygen in use  GI: Abd soft, non-tender, normoactive bowel sounds all quads  MS: MAEW. No overt deformity  NEURO:  No lateralizing neuro deficits. No tremors  SKIN: warm and dry, no rash or abrasion. No edema    Results for orders placed or performed during the hospital encounter of 07/10/24 (from the past 24 hour(s))   CBC and  Auto Differential   Result Value Ref Range    WBC 9.4 4.4 - 11.3 x10*3/uL    nRBC 0.0 0.0 - 0.0 /100 WBCs    RBC 3.32 (L) 4.00 - 5.20 x10*6/uL    Hemoglobin 10.6 (L) 12.0 - 16.0 g/dL    Hematocrit 30.9 (L) 36.0 - 46.0 %    MCV 93 80 - 100 fL    MCH 31.9 26.0 - 34.0 pg    MCHC 34.3 32.0 - 36.0 g/dL    RDW 13.2 11.5 - 14.5 %    Platelets 286 150 - 450 x10*3/uL    Neutrophils % 77.6 40.0 - 80.0 %    Immature Granulocytes %, Automated 0.6 0.0 - 0.9 %    Lymphocytes % 12.2 13.0 - 44.0 %    Monocytes % 9.2 2.0 - 10.0 %    Eosinophils % 0.3 0.0 - 6.0 %    Basophils % 0.1 0.0 - 2.0 %    Neutrophils Absolute 7.28 (H) 1.60 - 5.50 x10*3/uL    Immature Granulocytes Absolute, Automated 0.06 0.00 - 0.50 x10*3/uL    Lymphocytes Absolute 1.14 0.80 - 3.00 x10*3/uL    Monocytes Absolute 0.86 (H) 0.05 - 0.80 x10*3/uL    Eosinophils Absolute 0.03 0.00 - 0.40 x10*3/uL    Basophils Absolute 0.01 0.00 - 0.10 x10*3/uL   Basic metabolic panel   Result Value Ref Range    Glucose 109 (H) 65 - 99 mg/dL    Sodium 120 (L) 133 - 145 mmol/L    Potassium 4.4 3.4 - 5.1 mmol/L    Chloride 86 (L) 97 - 107 mmol/L    Bicarbonate 25 24 - 31 mmol/L    Urea Nitrogen 14 8 - 25 mg/dL    Creatinine 0.60 0.40 - 1.60 mg/dL    eGFR 88 >60 mL/min/1.73m*2    Calcium 8.7 8.5 - 10.4 mg/dL    Anion Gap 9 <=19 mmol/L   Urinalysis with Reflex Culture and Microscopic   Result Value Ref Range    Color, Urine Light-Yellow Light-Yellow, Yellow, Dark-Yellow    Appearance, Urine Clear Clear    Specific Gravity, Urine 1.015 1.005 - 1.035    pH, Urine 6.5 5.0, 5.5, 6.0, 6.5, 7.0, 7.5, 8.0    Protein, Urine 30 (1+) (A) NEGATIVE, 10 (TRACE), 20 (TRACE) mg/dL    Glucose, Urine Normal Normal mg/dL    Blood, Urine 0.2 (2+) (A) NEGATIVE    Ketones, Urine NEGATIVE NEGATIVE mg/dL    Bilirubin, Urine NEGATIVE NEGATIVE    Urobilinogen, Urine Normal Normal mg/dL    Nitrite, Urine NEGATIVE NEGATIVE    Leukocyte Esterase, Urine NEGATIVE NEGATIVE   Urinalysis Microscopic   Result Value Ref  Range    WBC, Urine 1-5 1-5, NONE /HPF    RBC, Urine 11-20 (A) NONE, 1-2, 3-5 /HPF    Squamous Epithelial Cells, Urine 1-9 (SPARSE) Reference range not established. /HPF    Bacteria, Urine 1+ (A) NONE SEEN /HPF    Mucus, Urine FEW Reference range not established. /LPF           1  Hyponatremia - critical. Most likely related to medication as a side effect. Both sertraline and trazodone can cause this, although the sertraline is more likely. Charted note read where it has already been planned to titrate off the sertraline. If hyponatremia after the sertraline is removed, recommend removing the trazodone also.  2  Dementia without behavioral disturbance - remains on memantime.      I personally contacted Kacy, the patient's POA after discovering that the patient has a DNR comfort care signed note with her records. Discussed the critical nature of the low sodium level. Kacy does not wish to rescind the DNR comfort care; states she wishes to continue the patient's stated wishes. Agrees with discharge back to the nursing home (rather than ICU admission) with the plan above (titrate off the sertraline). Kacy is aware that this is still a critical issue and the patient, Berenice, may do fine and improve as the sertraline is titrated off, or she may worsen and even die.  All questions answered to Kacy's satisfaction.    Dr. Farnsworth aware of this discussion and of the POA's desire that the patient be discharged back to the nursing home.

## 2024-07-11 NOTE — DISCHARGE INSTRUCTIONS
Please follow-up with your primary care provider regarding your ER visit and possible need for medication readjustment.    It is important to remember that your care does not end here and you must continue to monitor your condition closely. Please return to the emergency department for any worsening or concerning signs or symptoms as directed by our conversations and the discharge instructions. If you do not have a doctor please contact the referral number on your discharge instructions. Please contact any physician specialists provided in your discharge notes as it is very important to follow up with them regarding your condition. If you are unable to reach the physicians provided, please come back to the Emergency Department at any time.

## 2024-09-10 ENCOUNTER — NURSING HOME VISIT (OUTPATIENT)
Dept: POST ACUTE CARE | Facility: EXTERNAL LOCATION | Age: 84
End: 2024-09-10
Payer: MEDICARE

## 2024-09-10 DIAGNOSIS — I10 PRIMARY HYPERTENSION: ICD-10-CM

## 2024-09-10 DIAGNOSIS — F01.50 VASCULAR DEMENTIA WITHOUT BEHAVIORAL DISTURBANCE, PSYCHOTIC DISTURBANCE, MOOD DISTURBANCE, OR ANXIETY, UNSPECIFIED DEMENTIA SEVERITY (MULTI): Primary | ICD-10-CM

## 2024-09-10 ASSESSMENT — PAIN SCALES - GENERAL: PAINLEVEL: 0-NO PAIN

## 2024-09-10 NOTE — LETTER
"Patient: Berenice Townsend  : 1940    Encounter Date: 09/10/2024    Subjective  Patient ID: Berenice Townsend is a 84 y.o. female who is assisted living/ home patient being seen at [facility], and evaluated Routine AL Visit for transfer to memory care.     HPI     Pt visited in apartment at assisted living, oriented x1, comfortable, and denies pain. Pt up on couch watching TV. Pt welcomed visit. Pt currently on hospice services after an ER visit in July for hyponatremia. Family did not want to seek treatment due to DNR comfort care status and pt returned to AL. Denies changes in vision and hearing. Pt states appetite is \"normal\". Pt going to secured memory care unit for meals. Pt has required increased cueing for ADL's. Pt with own dentition, denies difficulty with chewing and swallowing. Denies headache, dizziness, congestion, and runny nose.     Pt denies chest pain, sob at rest or exertion. Pt denies constipation and voiding symptoms. Pt ambulates with rollator, denies any recent falls. 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 Pt denies sleep disturbances, and sleeps on couch at bedtime. Pt states \"I always use to sleep on the couch\".  Pt denies feelings of anxiety or sadness. Pt follows psych for depression. Pt thankful for visit, and had no further questions. Collateral information obtained from nursing due to cognition secondary to dementia.     Current Outpatient Medications on File Prior to Visit   Medication Sig Dispense Refill   • acetaminophen (Tylenol) 500 mg tablet Take 1 tablet (500 mg) by mouth every 4 hours if needed for moderate pain (4 - 6), headaches or mild pain (1 - 3).     • acetaminophen (Tylenol) 500 mg tablet Take 2 tablets (1,000 mg) by mouth 2 times a day.     • albuterol 90 mcg/actuation inhaler Inhale 2 puffs every 6 hours if needed for wheezing.     • alum-mag hydroxide-simeth (Mylanta) 200-200-20 mg/5 mL oral suspension Take 15 mL by mouth every 8 hours " if needed for indigestion.     • ceramides  lotion (CeraVe Daily Moisturizing) lotion lotion Apply 1 Application topically if needed (dry skin).     • Cetaphil (Cetaphil Moisturizing) moisturizing lotion Apply 1 Application topically if needed for dry skin.     • esomeprazole (NexIUM) 40 mg DR capsule Take 1 capsule (40 mg) by mouth 2 times a day.     • melatonin 5 mg tablet Take 1 tablet (5 mg) by mouth once daily at bedtime. 30 tablet 1   • memantine (Namenda) 5 mg tablet Take 1 tablet (5 mg) by mouth once daily.     • menthol 5 % adhesive patch,medicated Place 1 patch on the skin 2 times a day as needed (pain).     • olmesartan (BENIcar) 20 mg tablet Take 1 tablet (20 mg) by mouth once daily.     • polyethylene glycol (Glycolax, Miralax) 17 gram packet Take 17 g by mouth once daily.     • sertraline (Zoloft) 50 mg tablet Take 0.5 tablets (25 mg) by mouth once daily. Decrease from 50mg to 25mg x 7 days and then discontinue.     • simvastatin (Zocor) 40 mg tablet Take 1 tablet (40 mg) by mouth once daily at bedtime.     • traZODone (Desyrel) 50 mg tablet Take 0.5 tablets (25 mg) by mouth once daily at bedtime.       No current facility-administered medications on file prior to visit.      Review of Systems   Constitutional:  Negative for activity change, appetite change, chills, fatigue and fever.   HENT:  Negative for congestion and rhinorrhea.    Respiratory:  Negative for cough and shortness of breath.    Cardiovascular:  Negative for chest pain, palpitations and leg swelling.   Gastrointestinal:  Negative for constipation, diarrhea, nausea and vomiting.   Genitourinary:  Negative for frequency.   Musculoskeletal:  Positive for arthralgias, gait problem and myalgias.        Ambulates with rollator   Neurological:  Negative for dizziness, weakness and headaches.   Psychiatric/Behavioral:  Positive for confusion. Negative for sleep disturbance. The patient is not nervous/anxious.        Objective  /65 (BP  Location: Right arm, Patient Position: Sitting, BP Cuff Size: Adult)   Pulse 68   Resp 16   SpO2 96%     Physical Exam  Constitutional:       General: She is awake. She is not in acute distress.     Appearance: Normal appearance. She is underweight. She is not ill-appearing.   HENT:      Head: Normocephalic.      Nose: Nose normal. No congestion or rhinorrhea.      Mouth/Throat:      Mouth: Mucous membranes are moist.   Eyes:      Conjunctiva/sclera: Conjunctivae normal.      Pupils: Pupils are equal, round, and reactive to light.   Cardiovascular:      Rate and Rhythm: Normal rate and regular rhythm.      Pulses: Normal pulses.      Heart sounds: Normal heart sounds.   Pulmonary:      Effort: Pulmonary effort is normal. No respiratory distress.      Breath sounds: Normal breath sounds. No wheezing or rhonchi.   Abdominal:      General: Bowel sounds are normal.      Palpations: Abdomen is soft.   Musculoskeletal:         General: Normal range of motion.      Cervical back: Normal range of motion.      Right lower leg: No edema.      Left lower leg: No edema.   Skin:     General: Skin is warm.      Capillary Refill: Capillary refill takes less than 2 seconds.   Neurological:      Mental Status: She is alert. Mental status is at baseline.      Comments: Oriented x1   Psychiatric:         Mood and Affect: Mood normal.         Behavior: Behavior normal. Behavior is cooperative.         Cognition and Memory: Cognition is impaired. Memory is impaired.         Assessment/Plan  Diagnoses and all orders for this visit:  Vascular dementia without behavioral disturbance, psychotic disturbance, mood disturbance, or anxiety, unspecified dementia severity (Multi)  Comments:  Pt appropriate for secured memory care unit for increased assistance and supervision.  Primary hypertension  Comments:  Blood pressure controlled.  Lives in assisted living facility  Comments:  New H&P medical evaluation form completed for move to secured  unit for assisted living.          Electronically Signed By: ALPHONSE Powell-CNP   9/13/24  8:16 PM

## 2024-09-13 VITALS
OXYGEN SATURATION: 96 % | WEIGHT: 100.2 LBS | DIASTOLIC BLOOD PRESSURE: 65 MMHG | SYSTOLIC BLOOD PRESSURE: 118 MMHG | HEIGHT: 61 IN | RESPIRATION RATE: 16 BRPM | HEART RATE: 68 BPM | BODY MASS INDEX: 18.92 KG/M2

## 2024-09-13 ASSESSMENT — ENCOUNTER SYMPTOMS
FATIGUE: 0
FEVER: 0
HEADACHES: 0
NERVOUS/ANXIOUS: 0
RHINORRHEA: 0
DIZZINESS: 0
COUGH: 0
VOMITING: 0
MYALGIAS: 1
PALPITATIONS: 0
WEAKNESS: 0
FREQUENCY: 0
SLEEP DISTURBANCE: 0
CONSTIPATION: 0
CHILLS: 0
SHORTNESS OF BREATH: 0
APPETITE CHANGE: 0
DIARRHEA: 0
CONFUSION: 1
NAUSEA: 0
ARTHRALGIAS: 1
ACTIVITY CHANGE: 0

## 2024-09-13 NOTE — PROGRESS NOTES
"Subjective   Patient ID: Berenice Townsend is a 84 y.o. female who is assisted living/ home patient being seen at [facility], and evaluated Routine AL Visit for transfer to memory care.     HPI     Pt visited in apartment at assisted living, oriented x1, comfortable, and denies pain. Pt up on couch watching TV. Pt welcomed visit. Pt currently on hospice services after an ER visit in July for hyponatremia. Family did not want to seek treatment due to DNR comfort care status and pt returned to AL. Denies changes in vision and hearing. Pt states appetite is \"normal\". Pt going to secured memory care unit for meals. Pt has required increased cueing for ADL's. Pt with own dentition, denies difficulty with chewing and swallowing. Denies headache, dizziness, congestion, and runny nose.     Pt denies chest pain, sob at rest or exertion. Pt denies constipation and voiding symptoms. Pt ambulates with rollator, denies any recent falls. 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 Pt denies sleep disturbances, and sleeps on couch at bedtime. Pt states \"I always use to sleep on the couch\".  Pt denies feelings of anxiety or sadness. Pt follows psych for depression. Pt thankful for visit, and had no further questions. Collateral information obtained from nursing due to cognition secondary to dementia.     Current Outpatient Medications on File Prior to Visit   Medication Sig Dispense Refill    acetaminophen (Tylenol) 500 mg tablet Take 1 tablet (500 mg) by mouth every 4 hours if needed for moderate pain (4 - 6), headaches or mild pain (1 - 3).      acetaminophen (Tylenol) 500 mg tablet Take 2 tablets (1,000 mg) by mouth 2 times a day.      albuterol 90 mcg/actuation inhaler Inhale 2 puffs every 6 hours if needed for wheezing.      alum-mag hydroxide-simeth (Mylanta) 200-200-20 mg/5 mL oral suspension Take 15 mL by mouth every 8 hours if needed for indigestion.      ceramides  lotion (CeraVe Daily Moisturizing) " lotion lotion Apply 1 Application topically if needed (dry skin).      Cetaphil (Cetaphil Moisturizing) moisturizing lotion Apply 1 Application topically if needed for dry skin.      esomeprazole (NexIUM) 40 mg DR capsule Take 1 capsule (40 mg) by mouth 2 times a day.      melatonin 5 mg tablet Take 1 tablet (5 mg) by mouth once daily at bedtime. 30 tablet 1    memantine (Namenda) 5 mg tablet Take 1 tablet (5 mg) by mouth once daily.      menthol 5 % adhesive patch,medicated Place 1 patch on the skin 2 times a day as needed (pain).      olmesartan (BENIcar) 20 mg tablet Take 1 tablet (20 mg) by mouth once daily.      polyethylene glycol (Glycolax, Miralax) 17 gram packet Take 17 g by mouth once daily.      sertraline (Zoloft) 50 mg tablet Take 0.5 tablets (25 mg) by mouth once daily. Decrease from 50mg to 25mg x 7 days and then discontinue.      simvastatin (Zocor) 40 mg tablet Take 1 tablet (40 mg) by mouth once daily at bedtime.      traZODone (Desyrel) 50 mg tablet Take 0.5 tablets (25 mg) by mouth once daily at bedtime.       No current facility-administered medications on file prior to visit.      Review of Systems   Constitutional:  Negative for activity change, appetite change, chills, fatigue and fever.   HENT:  Negative for congestion and rhinorrhea.    Respiratory:  Negative for cough and shortness of breath.    Cardiovascular:  Negative for chest pain, palpitations and leg swelling.   Gastrointestinal:  Negative for constipation, diarrhea, nausea and vomiting.   Genitourinary:  Negative for frequency.   Musculoskeletal:  Positive for arthralgias, gait problem and myalgias.        Ambulates with rollator   Neurological:  Negative for dizziness, weakness and headaches.   Psychiatric/Behavioral:  Positive for confusion. Negative for sleep disturbance. The patient is not nervous/anxious.        Objective   /65 (BP Location: Right arm, Patient Position: Sitting, BP Cuff Size: Adult)   Pulse 68   Resp  "16   Ht 1.549 m (5' 1\")   Wt 45.5 kg (100 lb 3.2 oz)   SpO2 96%   BMI 18.93 kg/m²     Physical Exam  Constitutional:       General: She is awake. She is not in acute distress.     Appearance: Normal appearance. She is underweight. She is not ill-appearing.   HENT:      Head: Normocephalic.      Nose: Nose normal. No congestion or rhinorrhea.      Mouth/Throat:      Mouth: Mucous membranes are moist.   Eyes:      Conjunctiva/sclera: Conjunctivae normal.      Pupils: Pupils are equal, round, and reactive to light.   Cardiovascular:      Rate and Rhythm: Normal rate and regular rhythm.      Pulses: Normal pulses.      Heart sounds: Normal heart sounds.   Pulmonary:      Effort: Pulmonary effort is normal. No respiratory distress.      Breath sounds: Normal breath sounds. No wheezing or rhonchi.   Abdominal:      General: Bowel sounds are normal.      Palpations: Abdomen is soft.   Musculoskeletal:         General: Normal range of motion.      Cervical back: Normal range of motion.      Right lower leg: No edema.      Left lower leg: No edema.   Skin:     General: Skin is warm.      Capillary Refill: Capillary refill takes less than 2 seconds.   Neurological:      Mental Status: She is alert. Mental status is at baseline.      Comments: Oriented x1   Psychiatric:         Mood and Affect: Mood normal.         Behavior: Behavior normal. Behavior is cooperative.         Cognition and Memory: Cognition is impaired. Memory is impaired.         Assessment/Plan   Diagnoses and all orders for this visit:  Vascular dementia without behavioral disturbance, psychotic disturbance, mood disturbance, or anxiety, unspecified dementia severity (Multi)  Comments:  Pt appropriate for secured memory care unit for increased assistance and supervision.  Primary hypertension  Comments:  Blood pressure controlled.  Lives in assisted living facility  Comments:  New H&P medical evaluation form completed for move to secured unit for assisted " living.

## 2025-01-01 RX ORDER — HYDROMORPHONE HYDROCHLORIDE 2 MG/1
TABLET ORAL
Qty: 90 TABLET | Refills: 0 | Status: CANCELLED | OUTPATIENT
Start: 2025-01-01